# Patient Record
Sex: FEMALE | Race: BLACK OR AFRICAN AMERICAN | NOT HISPANIC OR LATINO
[De-identification: names, ages, dates, MRNs, and addresses within clinical notes are randomized per-mention and may not be internally consistent; named-entity substitution may affect disease eponyms.]

---

## 2019-10-14 ENCOUNTER — APPOINTMENT (OUTPATIENT)
Dept: DERMATOLOGY | Facility: CLINIC | Age: 39
End: 2019-10-14
Payer: COMMERCIAL

## 2019-10-14 VITALS — BODY MASS INDEX: 36.7 KG/M2 | HEIGHT: 64 IN | WEIGHT: 215 LBS

## 2019-10-14 DIAGNOSIS — L70.8 OTHER ACNE: ICD-10-CM

## 2019-10-14 PROBLEM — Z00.00 ENCOUNTER FOR PREVENTIVE HEALTH EXAMINATION: Status: ACTIVE | Noted: 2019-10-14

## 2019-10-14 PROCEDURE — 99203 OFFICE O/P NEW LOW 30 MIN: CPT

## 2021-02-26 ENCOUNTER — EMERGENCY (EMERGENCY)
Facility: HOSPITAL | Age: 41
LOS: 1 days | Discharge: HOME | End: 2021-02-26
Attending: EMERGENCY MEDICINE | Admitting: EMERGENCY MEDICINE
Payer: COMMERCIAL

## 2021-02-26 VITALS
RESPIRATION RATE: 18 BRPM | SYSTOLIC BLOOD PRESSURE: 150 MMHG | OXYGEN SATURATION: 100 % | HEART RATE: 88 BPM | TEMPERATURE: 98 F | DIASTOLIC BLOOD PRESSURE: 80 MMHG

## 2021-02-26 PROCEDURE — 99283 EMERGENCY DEPT VISIT LOW MDM: CPT

## 2021-02-26 NOTE — ED PROVIDER NOTE - NSFOLLOWUPINSTRUCTIONS_ED_ALL_ED_FT
Advance activity as tolerated.  Continue all previously prescribed medications as directed unless otherwise instructed.  Stop taking the allergy eye drops you were given by urgent care.  Start taking Opcon-A eye drops 1-2 drops every 6 hours as needed for eye itching and/or redness.  Follow up with your primary care physician and ophthalmology clinic ([address] [place] [address], [address], [place], [place] ([phone])) in 48-72 hours- bring copies of your results.  Return to the ER for worsening or persistent symptoms, including but not limited to worsening/persistent eye pain, vision loss, blurred vision, pain with eye movement, eyelid pain/swelling, and/or ANY NEW OR CONCERNING SYMPTOMS. If you have issues obtaining follow up, please call: [phone] to obtain a doctor or specialist who takes your insurance in your area.  You may call [phone] to make an appointment with the internal medicine clinic.

## 2021-02-26 NOTE — ED PROVIDER NOTE - OBJECTIVE STATEMENT
Pt is a 40 y/o F no pertinent PMHx p/w eye itching/redness x 3 weeks.  Pt reports daily bilateral eye itching associated with increased lacrimation and eye redness.  She notes similar to when she gets season allergies.  For past 1 week, she notes symptoms more intense.  She went to urgent care 5 days ago and was given allergy eye drops, one drop once a day.  Also taking Allegra-D once a day and lubricating eye ointment at night.  She notes she would get relief immediately after applying drops, but would gradually recur later on in the day.  She notes, at times, developing crusting to eyelids.  Pt denies any fevers, chills, double vision, pain with eye movement, eye trauma, use of contact lenses, vision loss, blurred vision, or any other specific complaints.

## 2021-02-26 NOTE — ED PROVIDER NOTE - ATTENDING CONTRIBUTION TO CARE
Attending Statement: I have reviewed and agree with all pertinent clinical information, including history and physical exam and agree with treatment plan of the PA, except as noted.  40yo F no sig pmhx pw eye itching x 3 weeks. Alternating between left and right eye, endorse itching and  in the morning endorse eyes are dry and crusted. Saw UC this week given eye drops and pt using a lubricant for night. no fever/chills. no blurred vision no double vision. Does not wear eye contact. no trauma.   Vital signs noted. nontoxic female looks comfortable. visual acuity noted. nl IOP. no corneal abrasions. bl conjunctiva  injected. no discharge. no work of breathing/  plan dc w Opcon, refer to opthalmology

## 2021-02-26 NOTE — ED PROVIDER NOTE - PHYSICAL EXAMINATION
Left eye IOP:  17 mmHg  Right eye IOP: 14 mmHg    Pain resolved after application of tetracaine eye drops

## 2021-02-26 NOTE — ED PROVIDER NOTE - CLINICAL SUMMARY MEDICAL DECISION MAKING FREE TEXT BOX
Pt is a 42 y/o F no pertinent PMHx p/w eye itching/redness x 3 weeks -- likely allergic conjunctivitis; not concerning for bacterial conjunctivitis, no corneal ulcerations, no corneal abrasions -- opcon-A eye drops, outpatient ophthalmology follow up

## 2021-02-26 NOTE — ED PROVIDER NOTE - BILATERAL EYES
no eyelid swelling/redness/crepitus/fluctuance/clear/pupils equal, round, and reactive to light/CONJUNCTIVA ERYTHEMA

## 2021-02-26 NOTE — ED PROVIDER NOTE - PATIENT PORTAL LINK FT
You can access the FollowMyHealth Patient Portal offered by [facility] by registering at the following website: [email or web address]. By joining [employer]’s FollowMyHealth portal, you will also be able to view your health information using other applications (apps) compatible with our system.

## 2021-03-01 ENCOUNTER — APPOINTMENT (OUTPATIENT)
Dept: OPHTHALMOLOGY | Facility: CLINIC | Age: 41
End: 2021-03-01
Payer: COMMERCIAL

## 2021-03-01 ENCOUNTER — NON-APPOINTMENT (OUTPATIENT)
Age: 41
End: 2021-03-01

## 2021-03-01 PROCEDURE — 99072 ADDL SUPL MATRL&STAF TM PHE: CPT

## 2021-03-01 PROCEDURE — 92004 COMPRE OPH EXAM NEW PT 1/>: CPT

## 2021-03-29 ENCOUNTER — APPOINTMENT (OUTPATIENT)
Dept: OPHTHALMOLOGY | Facility: CLINIC | Age: 41
End: 2021-03-29

## 2021-08-19 ENCOUNTER — APPOINTMENT (OUTPATIENT)
Dept: DERMATOLOGY | Facility: CLINIC | Age: 41
End: 2021-08-19
Payer: COMMERCIAL

## 2021-08-19 DIAGNOSIS — L82.0 INFLAMED SEBORRHEIC KERATOSIS: ICD-10-CM

## 2021-08-19 DIAGNOSIS — L70.0 ACNE VULGARIS: ICD-10-CM

## 2021-08-19 DIAGNOSIS — L72.0 EPIDERMAL CYST: ICD-10-CM

## 2021-08-19 PROCEDURE — 17110 DESTRUCTION B9 LES UP TO 14: CPT

## 2021-08-19 PROCEDURE — 99214 OFFICE O/P EST MOD 30 MIN: CPT | Mod: 25

## 2021-08-19 RX ORDER — TRETINOIN 0.25 MG/G
0.03 CREAM TOPICAL
Qty: 1 | Refills: 2 | Status: ACTIVE | COMMUNITY
Start: 2021-08-19 | End: 1900-01-01

## 2021-08-19 NOTE — PHYSICAL EXAM
[FreeTextEntry3] : AAOx3, pleasant, NAD, no visual lymphadenopathy\par hair, scalp, face, nose, eyelids, ears, lips, oropharynx, neck, chest, abdomen, back, right arm, left arm, nails, and hands examined with all normal findings,\par pertinent findings include:\par \par multiple papules, pustules and open comedones on face\par verrcous papules on left axillae

## 2021-08-19 NOTE — HISTORY OF PRESENT ILLNESS
[FreeTextEntry1] : oil skin, growth [de-identified] : 41 year old female with oily skin and acne. also growths in axillae. enlarging.

## 2021-08-19 NOTE — ASSESSMENT
[FreeTextEntry1] : acne\par education\par tretinoin 0.025% cream at bedtime; SED\par \par inflamed SKs\par -treated with cautery at setting of 2.5; SED including burning, scarring, and incomplete tx. patient verbalized understanding\par \par \par plan for cyst excision on left groin

## 2021-08-26 ENCOUNTER — APPOINTMENT (OUTPATIENT)
Dept: DERMATOLOGY | Facility: CLINIC | Age: 41
End: 2021-08-26
Payer: COMMERCIAL

## 2021-08-26 ENCOUNTER — LABORATORY RESULT (OUTPATIENT)
Age: 41
End: 2021-08-26

## 2021-08-26 DIAGNOSIS — D48.5 NEOPLASM OF UNCERTAIN BEHAVIOR OF SKIN: ICD-10-CM

## 2021-08-26 PROCEDURE — 27047 EXC HIP/PELVIS LES SC < 3 CM: CPT | Mod: 59,79

## 2021-08-26 PROCEDURE — 13121 CMPLX RPR S/A/L 2.6-7.5 CM: CPT | Mod: 59,79

## 2021-09-01 ENCOUNTER — NON-APPOINTMENT (OUTPATIENT)
Age: 41
End: 2021-09-01

## 2022-09-06 ENCOUNTER — APPOINTMENT (OUTPATIENT)
Dept: DERMATOLOGY | Facility: CLINIC | Age: 42
End: 2022-09-06

## 2022-10-01 ENCOUNTER — EMERGENCY (EMERGENCY)
Facility: HOSPITAL | Age: 42
LOS: 1 days | Discharge: HOME | End: 2022-10-01
Attending: EMERGENCY MEDICINE
Payer: COMMERCIAL

## 2022-10-01 VITALS
HEART RATE: 68 BPM | RESPIRATION RATE: 18 BRPM | DIASTOLIC BLOOD PRESSURE: 67 MMHG | OXYGEN SATURATION: 99 % | SYSTOLIC BLOOD PRESSURE: 157 MMHG | TEMPERATURE: 98 F

## 2022-10-01 VITALS
TEMPERATURE: 98 F | HEART RATE: 111 BPM | SYSTOLIC BLOOD PRESSURE: 165 MMHG | RESPIRATION RATE: 22 BRPM | OXYGEN SATURATION: 95 % | WEIGHT: 210.1 LBS | DIASTOLIC BLOOD PRESSURE: 106 MMHG | HEIGHT: 64 IN

## 2022-10-01 LAB
ALBUMIN SERPL ELPH-MCNC: 4.1 G/DL — SIGNIFICANT CHANGE UP (ref 3.3–5)
ALP SERPL-CCNC: 73 U/L — SIGNIFICANT CHANGE UP (ref 40–120)
ALT FLD-CCNC: 21 U/L — SIGNIFICANT CHANGE UP (ref 10–45)
ANION GAP SERPL CALC-SCNC: 13 MMOL/L — SIGNIFICANT CHANGE UP (ref 5–17)
APPEARANCE UR: ABNORMAL
APTT BLD: 31.4 SEC — SIGNIFICANT CHANGE UP (ref 27.5–35.5)
AST SERPL-CCNC: 34 U/L — SIGNIFICANT CHANGE UP (ref 10–40)
BACTERIA # UR AUTO: NEGATIVE — SIGNIFICANT CHANGE UP
BASOPHILS # BLD AUTO: 0.05 K/UL — SIGNIFICANT CHANGE UP (ref 0–0.2)
BASOPHILS NFR BLD AUTO: 0.6 % — SIGNIFICANT CHANGE UP (ref 0–2)
BILIRUB SERPL-MCNC: 0.2 MG/DL — SIGNIFICANT CHANGE UP (ref 0.2–1.2)
BILIRUB UR-MCNC: NEGATIVE — SIGNIFICANT CHANGE UP
BLD GP AB SCN SERPL QL: NEGATIVE — SIGNIFICANT CHANGE UP
BUN SERPL-MCNC: 14 MG/DL — SIGNIFICANT CHANGE UP (ref 7–23)
CALCIUM SERPL-MCNC: 9.6 MG/DL — SIGNIFICANT CHANGE UP (ref 8.4–10.5)
CHLORIDE SERPL-SCNC: 102 MMOL/L — SIGNIFICANT CHANGE UP (ref 96–108)
CO2 SERPL-SCNC: 20 MMOL/L — LOW (ref 22–31)
COLOR SPEC: ABNORMAL
CREAT SERPL-MCNC: 0.81 MG/DL — SIGNIFICANT CHANGE UP (ref 0.5–1.3)
DIFF PNL FLD: ABNORMAL
EGFR: 94 ML/MIN/1.73M2 — SIGNIFICANT CHANGE UP
EOSINOPHIL # BLD AUTO: 0.48 K/UL — SIGNIFICANT CHANGE UP (ref 0–0.5)
EOSINOPHIL NFR BLD AUTO: 5.9 % — SIGNIFICANT CHANGE UP (ref 0–6)
EPI CELLS # UR: 1 /HPF — SIGNIFICANT CHANGE UP
GLUCOSE SERPL-MCNC: 89 MG/DL — SIGNIFICANT CHANGE UP (ref 70–99)
GLUCOSE UR QL: NEGATIVE — SIGNIFICANT CHANGE UP
HCG SERPL-ACNC: 5731 MIU/ML — HIGH
HCT VFR BLD CALC: 33.2 % — LOW (ref 34.5–45)
HGB BLD-MCNC: 10.6 G/DL — LOW (ref 11.5–15.5)
IMM GRANULOCYTES NFR BLD AUTO: 0.2 % — SIGNIFICANT CHANGE UP (ref 0–0.9)
INR BLD: 1.02 RATIO — SIGNIFICANT CHANGE UP (ref 0.88–1.16)
KETONES UR-MCNC: SIGNIFICANT CHANGE UP
LEUKOCYTE ESTERASE UR-ACNC: ABNORMAL
LYMPHOCYTES # BLD AUTO: 2.53 K/UL — SIGNIFICANT CHANGE UP (ref 1–3.3)
LYMPHOCYTES # BLD AUTO: 31 % — SIGNIFICANT CHANGE UP (ref 13–44)
MCHC RBC-ENTMCNC: 28 PG — SIGNIFICANT CHANGE UP (ref 27–34)
MCHC RBC-ENTMCNC: 31.9 GM/DL — LOW (ref 32–36)
MCV RBC AUTO: 87.6 FL — SIGNIFICANT CHANGE UP (ref 80–100)
MONOCYTES # BLD AUTO: 0.59 K/UL — SIGNIFICANT CHANGE UP (ref 0–0.9)
MONOCYTES NFR BLD AUTO: 7.2 % — SIGNIFICANT CHANGE UP (ref 2–14)
NEUTROPHILS # BLD AUTO: 4.48 K/UL — SIGNIFICANT CHANGE UP (ref 1.8–7.4)
NEUTROPHILS NFR BLD AUTO: 55.1 % — SIGNIFICANT CHANGE UP (ref 43–77)
NITRITE UR-MCNC: NEGATIVE — SIGNIFICANT CHANGE UP
NRBC # BLD: 0 /100 WBCS — SIGNIFICANT CHANGE UP (ref 0–0)
PH UR: 6 — SIGNIFICANT CHANGE UP (ref 5–8)
PLATELET # BLD AUTO: 260 K/UL — SIGNIFICANT CHANGE UP (ref 150–400)
POTASSIUM SERPL-MCNC: 4.9 MMOL/L — SIGNIFICANT CHANGE UP (ref 3.5–5.3)
POTASSIUM SERPL-SCNC: 4.9 MMOL/L — SIGNIFICANT CHANGE UP (ref 3.5–5.3)
PROT SERPL-MCNC: 7.1 G/DL — SIGNIFICANT CHANGE UP (ref 6–8.3)
PROT UR-MCNC: ABNORMAL
PROTHROM AB SERPL-ACNC: 11.8 SEC — SIGNIFICANT CHANGE UP (ref 10.5–13.4)
RBC # BLD: 3.79 M/UL — LOW (ref 3.8–5.2)
RBC # FLD: 14 % — SIGNIFICANT CHANGE UP (ref 10.3–14.5)
RBC CASTS # UR COMP ASSIST: 3251 /HPF — HIGH (ref 0–4)
RH IG SCN BLD-IMP: POSITIVE — SIGNIFICANT CHANGE UP
SODIUM SERPL-SCNC: 135 MMOL/L — SIGNIFICANT CHANGE UP (ref 135–145)
SP GR SPEC: 1.04 — HIGH (ref 1.01–1.02)
UROBILINOGEN FLD QL: NEGATIVE — SIGNIFICANT CHANGE UP
WBC # BLD: 8.15 K/UL — SIGNIFICANT CHANGE UP (ref 3.8–10.5)
WBC # FLD AUTO: 8.15 K/UL — SIGNIFICANT CHANGE UP (ref 3.8–10.5)
WBC UR QL: 3 /HPF — SIGNIFICANT CHANGE UP (ref 0–5)

## 2022-10-01 PROCEDURE — 86900 BLOOD TYPING SEROLOGIC ABO: CPT

## 2022-10-01 PROCEDURE — 86850 RBC ANTIBODY SCREEN: CPT

## 2022-10-01 PROCEDURE — 86901 BLOOD TYPING SEROLOGIC RH(D): CPT

## 2022-10-01 PROCEDURE — 85610 PROTHROMBIN TIME: CPT

## 2022-10-01 PROCEDURE — 76817 TRANSVAGINAL US OBSTETRIC: CPT | Mod: 26

## 2022-10-01 PROCEDURE — 85025 COMPLETE CBC W/AUTO DIFF WBC: CPT

## 2022-10-01 PROCEDURE — 99284 EMERGENCY DEPT VISIT MOD MDM: CPT | Mod: 25

## 2022-10-01 PROCEDURE — 81001 URINALYSIS AUTO W/SCOPE: CPT

## 2022-10-01 PROCEDURE — 85730 THROMBOPLASTIN TIME PARTIAL: CPT

## 2022-10-01 PROCEDURE — 87086 URINE CULTURE/COLONY COUNT: CPT

## 2022-10-01 PROCEDURE — 84702 CHORIONIC GONADOTROPIN TEST: CPT

## 2022-10-01 PROCEDURE — 36415 COLL VENOUS BLD VENIPUNCTURE: CPT

## 2022-10-01 PROCEDURE — 99285 EMERGENCY DEPT VISIT HI MDM: CPT

## 2022-10-01 PROCEDURE — 76817 TRANSVAGINAL US OBSTETRIC: CPT

## 2022-10-01 PROCEDURE — 80053 COMPREHEN METABOLIC PANEL: CPT

## 2022-10-01 NOTE — CONSULT NOTE ADULT - ASSESSMENT
43 y/o  LMP  presenting for heavy vaginal bleeding; likely a combination of heavy menses with known history of fibroids and likely complete .    #Vaginal Bleeding  - H/H 10.6/33.2, Type O+  - Vital signs stable; patient non-tachycardic  - TVUS: scattered fibroids, endometrium 13mm, no intrauterine or extrauterine gestation identified, RO/LO wnl   - Upon physical exam, minimal vaginal bleeding; likely having normal menses compounded with complete  that recently passed  - Patient stable from Gyn perspective for discharge; no acute intervention  - Primary management per ED  - Return to clinic on Monday 10/3 for repeat beta HCG  - Return to ED if heavy vaginal bleeding soaking through 1 maxi pad an hour, passing clots, lightheadedness/dizziness    d/w attending Dr Jose Seay  PGY-2

## 2022-10-01 NOTE — ED PROVIDER NOTE - NS ED ROS FT
CONSTITUTIONAL: No fevers, no chills, + lightheadedness, no dizziness  EYES: no visual changes, no eye pain  EARS: no ear drainage, no ear pain, no change in hearing  NOSE: no nasal congestion  MOUTH/THROAT: no sore throat  CV: No chest pain, no palpitations  RESP: No SOB, no cough  GI: No n/v/d, no abd pain  : no dysuria, + hematuria, no flank pain, Vaginal bleeding x 1 day   MSK: no back pain, no extremity pain  SKIN: no rashes  NEURO: no headache, no focal weakness, no decreased sensation/parasthesias   PSYCHIATRIC: no known mental health issues

## 2022-10-01 NOTE — ED PROVIDER NOTE - PROGRESS NOTE DETAILS
Erwin PGY1 MD- Pelvic exam performed with chaperone nurse puelo  Cervical os visualized and is closed. Aleta Bruce DO (PGY2): pt signed out to me by day team. Pt reassessed, sx improved, no longer lightheaded, decreased bleeding while in ED.  Spoke with obgyn, will have pt follow up with Attending Dr. Garcia for repeat hcg and labs. Pt made aware of lab and imaging results. Questions regarding their symptoms were addressed. Given strict return precautions. Pt verbalized understanding.

## 2022-10-01 NOTE — ED PROVIDER NOTE - ATTENDING CONTRIBUTION TO CARE
I performed a history and physical exam of the patient and discussed their management with the resident and /or advanced care provider. I reviewed the resident and /or ACP's note and agree with the documented findings and plan of care. My medical decision making and observations are found above.  Lungs clear abd soft, awake and alert.

## 2022-10-01 NOTE — ED PROVIDER NOTE - OBJECTIVE STATEMENT
Patient is a 42y female who presents to the ED with vaginal bleeding. Patient states she started her period yesterday and has been going through a significant amount of pads. States she went through 8 pads today. Has a history of irregular periods. Patient is a 42y female who presents to the ED with vaginal bleeding. Patient states she started her period yesterday and has been going through a significant amount of pads. States she went through 8 pads today. Has a history of irregular periods. Patient also has a hx of fibroids. States she feels lightheaded when she walks. Denies any shortness of breath, fever, chills, cough, syncope, chest pain.

## 2022-10-01 NOTE — ED ADULT NURSE NOTE - CHPI ED NUR SYMPTOMS NEG
no abdominal pain/no back pain/no coffee grounds emesis/no discharge/no nausea/no pain/no vaginal discharge/no vomiting

## 2022-10-01 NOTE — ED PROVIDER NOTE - CLINICAL SUMMARY MEDICAL DECISION MAKING FREE TEXT BOX
Alvaro: vaginal bleeding with blood and clots since last night. Dizzy when standing. more abnoramlly spaced periods recently. Not on blood thinners. Will do pelvic and check labs as well as pregnancy test Patient is seen for vaginal bleeding and clots since last night. Patient is dizzy when she stands. Patient has a history of fibroids. We will obtain basic labs to check hemoglobin levels. Obtain pregnancy test and TVUS.     Alvaro: vaginal bleeding with blood and clots since last night. Dizzy when standing. more abnoramlly spaced periods recently. Not on blood thinners. Will do pelvic and check labs as well as pregnancy test

## 2022-10-01 NOTE — ED ADULT NURSE NOTE - OBJECTIVE STATEMENT
42 year old female comes in today with 2 days of vaginal bleeding LMP 9/4 Pt started bleeding yesterday very heavy with clots not normal for her.  Pt denies pregnancy  had vasectomy.  No pian noted at all Pt has hx of fibroids IVL placed and bloods sent as ordered Leandra

## 2022-10-01 NOTE — ED PROVIDER NOTE - NSFOLLOWUPINSTRUCTIONS_ED_ALL_ED_FT
Please follow up with Dr. escalante on Monday for further labs.   Please see primary care doctor within 1 week.     SEEK IMMEDIATE MEDICAL CARE IF YOU HAVE ANY OF THE FOLLOWING SYMPTOMS: extreme weakness/chest pain/shortness of breath, black or bloody stools, vomiting blood, fainting, fever, or any signs of dehydration, severe abdominal pain, bleeding through more than one pad per hour for more than 2 hours.

## 2022-10-01 NOTE — ED ADULT TRIAGE NOTE - HEART RATE (BEATS/MIN)
New pt, c/o heavy  and irregular period'121/78  LMP: 2/27/22  Last pap smear per pt, 2020, normal 111

## 2022-10-01 NOTE — ED PROVIDER NOTE - PATIENT PORTAL LINK FT
You can access the FollowMyHealth Patient Portal offered by [facility] by registering at the following website: [email or web address]. By joining [employer]’s FollowMyHealth portal, you will also be able to view your health information using other applications (apps) compatible with our system.

## 2022-10-01 NOTE — ED PROVIDER NOTE - PHYSICAL EXAMINATION
Physical Exam:  Gen: NAD, AOx3, non-toxic appearing, able to ambulate without assistance  Head: NCAT  HEENT: EOMI, PEERLA, normal conjunctiva, tongue midline, oral mucosa moist  Lung: CTAB, no respiratory distress, no wheezes/rhonchi/rales B/L, speaking in full sentences  CV: RRR, no murmurs, rubs or gallops  Abd: soft, NT, ND, no guarding, no rigidity, no rebound tenderness, no CVA tenderness   MSK: no visible deformities, ROM normal in UE/LE, no back pain  Neuro: No focal sensory or motor deficits  Skin: Warm, well perfused, no rash, no leg swelling  Psych: normal affect, calm  Pelvic exam with chaperone- Cervical os visualized (closed), no clots

## 2022-10-01 NOTE — ED ADULT NURSE NOTE - CAPILLARY REFILL
Patient notified by my [facility]; Labs are stable and no medications changes. Repeat labs due 2/11/20.     2 seconds or less

## 2022-10-01 NOTE — CONSULT NOTE ADULT - SUBJECTIVE AND OBJECTIVE BOX
TAIWO JONES  42y  Female [id number]    HPI:  43 y/o  LMP  presenting to ED for heavy vaginal bleeding since yesterday. Incidentally found to have elevated bHCG.    Patient states that she normally has heavy, irregular periods - irregular since February. States that since her period started yesterday, she was bleeding more than usual. Initially 8 pads all through yesterday, now saturating 1 pad an hour, bleeding initially worsened upon presentation to ED but has now improved in the past hour. Had lightheadedness, dizziness when walking but during interview stated she felt okay and was eating without difficulty.    Of note, patient found to have an elevated bHCG to 5731. Without partner in room, states that she has been sleeping with another person. Partner has had a vasectomy and is unaware. Does not use contraception.     Name of GYN Physician: Travis Mayo MD ([facility])    ObHx:  FT , medabx1, surgabx1  GynHx: endorses fibroids, HSV (no active lesions), previous abnl pap requiring biopsy with recent wnl pap; denies cysts, endometriosis, recent abnormal pap smears   PMHx: denies  SurgHx: D&Cx1  Meds: weight loss supplement(?)  Allergies: NKDA  Social History:  Denies smoking use, drug use, alcohol use.    Vital Signs Last 24 Hrs  T(C): 37.1 (01 Oct 2022 14:02), Max: 37.1 (01 Oct 2022 14:02)  T(F): 98.7 (01 Oct 2022 14:02), Max: 98.7 (01 Oct 2022 14:02)  HR: 81 (01 Oct 2022 18:57) (81 - 111)  BP: 168/75 (01 Oct 2022 18:57) (143/91 - 168/75)  BP(mean): --  RR: 16 (01 Oct 2022 18:57) (16 - 22)  SpO2: 100% (01 Oct 2022 18:57) (95% - 100%)    Parameters below as of 01 Oct 2022 18:57  Patient On (Oxygen Delivery Method): room air    Physical Exam:   General: sitting comfortably in bed, NAD   Abd: Soft, non-tender, non-distended.  Bowel sounds present.    :  No bleeding on pad. External labia wnl. Bimanual exam with no cervical motion tenderness, uterus wnl, adnexa non palpable b/l.  Cervix closed  Speculum Exam: No active bleeding from os. ~15cc dark clot in vagina. Physiologic discharge.    Ext: non-tender b/l, no edema     Chaperoned by Junie Antonio RN    LABS:             10.6   8.15  )-----------( 260      ( 01 Oct 2022 14:55 )             33.2     10-01    135  |  102  |  14  ----------------------------<  89  4.9   |  20<L>  |  0.81    Ca    9.6      01 Oct 2022 14:55    TPro  7.1  /  Alb  4.1  /  TBili  0.2  /  DBili  x   /  AST  34  /  ALT  21  /  AlkPhos  73  10-01    I&O's Detail    PT/INR - ( 01 Oct 2022 14:55 )   PT: 11.8 sec;   INR: 1.02 ratio         PTT - ( 01 Oct 2022 14:55 )  PTT:31.4 sec  Urinalysis Basic - ( 01 Oct 2022 17:58 )    Color: Light Orange / Appearance: Turbid / S.036 / pH: x  Gluc: x / Ketone: Trace  / Bili: Negative / Urobili: Negative   Blood: x / Protein: 30 mg/dL / Nitrite: Negative   Leuk Esterase: Small / RBC: 3251 /hpf / WBC 3 /HPF   Sq Epi: x / Non Sq Epi: 1 /hpf / Bacteria: Negative    RADIOLOGY & ADDITIONAL STUDIES:  < from: US Transvaginal, OB (10.01.22 @ 17:25) >  ACC: [id number] EXAM:  US OB TRANSVAGINAL                          PROCEDURE DATE:  10/01/2022          INTERPRETATION:  CLINICAL INFORMATION: Vaginal bleeding and positive   pregnancy test. HCG is 5731    LMP: 8/15/2022    Estimated Gestational Age by LMP: 6 weeks and 5 days    COMPARISON: None available.    Endovaginal pelvic sonogram only. Color Doppler was performed.    FINDINGS:  Uterus: The uterus measures 9.1 x 5.4 x 5.7 cm. No intrauterine pregnancy   visualized. Scattered fibroids with the largest on the right measuring   2.0 x 1.9 x 1.8 cm. Nabothian cyst noted.  Endometrium: 13 mm. No intrauterine gestation is identified.    Right ovary: 1.5 x 1.2 x 2.0 cm. Within normal limits. Vascular flow   noted.  Left ovary: 2.3 x 2.1 x 1.9 cm. Within normal limits. Vascular flow   noted. Corpus luteal cyst measures 1.7 cm    Fluid: None.    IMPRESSION:  Neither an intrauterine nor extrauterine gestation is identified. This   represents a pregnancy of indeterminate location. Differential diagnosis   includes early normal IUP, pregnancy failure or ectopic pregnancy. Serial   hCG and ultrasound are recommended to determine the significance of these   findings.    --- End of Report ---           CONNOR KRAUS MD; Resident Radiologist  This document has been electronically signed.  GERSON RUDOLPH MD; Attending Radiologist  This document has been electronically signed. Oct  1 2022  6:03PM    < end of copied text >

## 2022-10-01 NOTE — ED ADULT NURSE NOTE - NSIMPLEMENTINTERV_GEN_ALL_ED
Implemented All Universal Safety Interventions:  [place] to call system. Call bell, personal items and telephone within reach. Instruct patient to call for assistance. Room bathroom lighting operational. Non-slip footwear when patient is off stretcher. Physically safe environment: no spills, clutter or unnecessary equipment. Stretcher in lowest position, wheels locked, appropriate side rails in place. Breath sounds clear and equal bilaterally. Breath sounds clear and equal bilaterally. No retractions.

## 2022-10-02 LAB
CULTURE RESULTS: SIGNIFICANT CHANGE UP
SPECIMEN SOURCE: SIGNIFICANT CHANGE UP

## 2023-10-11 NOTE — ED PROVIDER NOTE - WET READ LAUNCH FT
There are no Wet Read(s) to document. Star Wedge Flap Text: The defect edges were debeveled with a #15 scalpel blade.  Given the location of the defect, shape of the defect and the proximity to free margins a star wedge flap was deemed most appropriate.  Using a sterile surgical marker, an appropriate rotation flap was drawn incorporating the defect and placing the expected incisions within the relaxed skin tension lines where possible. The area thus outlined was incised deep to adipose tissue with a #15 scalpel blade.  The skin margins were undermined to an appropriate distance in all directions utilizing iris scissors.

## 2023-12-29 PROBLEM — Z78.9 OTHER SPECIFIED HEALTH STATUS: Chronic | Status: ACTIVE | Noted: 2022-10-01

## 2024-08-26 NOTE — ED PROVIDER NOTE - SKIN, MLM
Occupational Therapy  Facility/Department: [facility] C4 U  Occupational Therapy Initial Assessment/ Treatment Note    Name: Joyce Uribe  : 1957  MRN: [id number]  Date of Service: 2024    Discharge Recommendations:  Subacute/Skilled Nursing Facility  OT Equipment Recommendations  Equipment Needed: No (defer)    Therapy discharge recommendations are subject to collaboration from the patient’s interdisciplinary healthcare team, including MD and case management recommendations.    Barriers to Home Discharge:   [x] Steps to access home entry or bed/bath:   [x] Unable to transfer, ambulate, or propel wheelchair household distances without assist   [] Limited available assist at home upon discharge    [x] Patient or family requests d/c to post-acute facility    [x] Poor cognition/safety awareness for d/c to home alone    [] Unable to maintain ordered weight bearing status    [] Patient with salient signs of long-standing immobility   [x] Decreased independence with ADLs   [x] Increased risk for falls   [] Other:    If pt is unable to be seen after this session, please let this note serve as discharge summary.  Please see case management note for discharge disposition.  Thank you.     Patient Diagnosis(es): The primary encounter diagnosis was COPD exacerbation (HCC). Diagnoses of Dyspnea and respiratory abnormalities, Hypoxia, Acute on chronic hypoxic respiratory failure (HCC), and Pneumonia of right lower lobe due to infectious organism were also pertinent to this visit.  Past Medical History:  has a past medical history of Anxiety, Asthma, Cancer (HCC), Cancer of lung (HCC), CHF (congestive heart failure) (HCC), COPD (chronic obstructive pulmonary disease) (HCC), Cyclic vomiting syndrome, Cysts of both kidneys, Depression, Fatty liver, Gastritis, MI (myocardial infarction) (HCC), Panic attacks, Pneumonia, Pre-diabetes, and Tricuspid regurgitation.  Past Surgical History:  has a past surgical history that  correct errors made  Insights: Decreased awareness of deficits  Initiation: Requires cues for some  Sequencing: Requires cues for some  Orientation  Overall Orientation Status: Within Functional Limits  Orientation Level: Oriented X4    Education Given To: Patient  Education Provided: Role of Therapy;Plan of Care;Precautions;Transfer Training;Fall Prevention Strategies  Education Method: Verbal  Barriers to Learning: None  Education Outcome: Verbalized understanding;Continued education needed  Disease Specific Education: Pt educated on importance of OOB mobility, prevention of complications of bedrest, and general safety during hospitalization. Pt verbalized understanding    AM-PAC - ADL  AM-PAC Daily Activity - Inpatient   How much help is needed for putting on and taking off regular lower body clothing?: A Little  How much help is needed for bathing (which includes washing, rinsing, drying)?: A Lot  How much help is needed for toileting (which includes using toilet, bedpan, or urinal)?: Total  How much help is needed for putting on and taking off regular upper body clothing?: A Little  How much help is needed for taking care of personal grooming?: A Little  How much help for eating meals?: None  AM-[facility] Inpatient Daily Activity Raw Score: 16  AM-PAC Inpatient ADL T-Scale Score : 35.96  ADL Inpatient CMS 0-100% Score: 53.32  ADL Inpatient CMS G-Code Modifier : CK    Goals  Short Term Goals  Time Frame for Short Term Goals: 9/03/24 unless noted  Short Term Goal 1: pt will complete LB dress SUPV  Short Term Goal 2: pt will complete toileting SUPV  Short Term Goal 3: pt will complete functional / toilet transfer SUPV  Short Term Goal 4: pt will complete x2-3 standing grooming ADLs SUPV  Short Term Goal 5: pt will complete x10-15 reps BUE therex for inc strengthening -- by 9/01  Patient Goals   Patient goals : \"to go home\"      Therapy Time   Individual Concurrent Group Co-treatment   Time In       1233   Time Out        Skin normal color for race, warm, dry and intact. No evidence of rash.